# Patient Record
Sex: MALE | Race: BLACK OR AFRICAN AMERICAN | NOT HISPANIC OR LATINO | Employment: OTHER | URBAN - METROPOLITAN AREA
[De-identification: names, ages, dates, MRNs, and addresses within clinical notes are randomized per-mention and may not be internally consistent; named-entity substitution may affect disease eponyms.]

---

## 2017-05-11 ENCOUNTER — HISTORICAL (OUTPATIENT)
Dept: ADMINISTRATIVE | Facility: HOSPITAL | Age: 67
End: 2017-05-11

## 2018-01-03 ENCOUNTER — HISTORICAL (OUTPATIENT)
Dept: ADMINISTRATIVE | Facility: HOSPITAL | Age: 68
End: 2018-01-03

## 2018-03-01 ENCOUNTER — HISTORICAL (OUTPATIENT)
Dept: LAB | Facility: HOSPITAL | Age: 68
End: 2018-03-01

## 2018-07-03 ENCOUNTER — HISTORICAL (OUTPATIENT)
Dept: ADMINISTRATIVE | Facility: HOSPITAL | Age: 68
End: 2018-07-03

## 2018-07-03 LAB
ALBUMIN SERPL-MCNC: 3.7 GM/DL (ref 3.4–5)
ALBUMIN/GLOB SERPL: 1.2 RATIO (ref 1.1–2)
ALP SERPL-CCNC: 58 UNIT/L (ref 50–136)
ALT SERPL-CCNC: 51 UNIT/L (ref 12–78)
AST SERPL-CCNC: 33 UNIT/L (ref 15–37)
BILIRUB SERPL-MCNC: 0.7 MG/DL (ref 0.2–1)
BILIRUBIN DIRECT+TOT PNL SERPL-MCNC: 0.2 MG/DL (ref 0–0.5)
BILIRUBIN DIRECT+TOT PNL SERPL-MCNC: 0.5 MG/DL (ref 0–0.8)
BUN SERPL-MCNC: 15 MG/DL (ref 7–18)
CALCIUM SERPL-MCNC: 9.1 MG/DL (ref 8.5–10.1)
CHLORIDE SERPL-SCNC: 107 MMOL/L (ref 98–107)
CO2 SERPL-SCNC: 29 MMOL/L (ref 21–32)
CREAT SERPL-MCNC: 1.18 MG/DL (ref 0.7–1.3)
GLOBULIN SER-MCNC: 3.1 GM/DL (ref 2.4–3.5)
GLUCOSE SERPL-MCNC: 132 MG/DL (ref 74–106)
POTASSIUM SERPL-SCNC: 4.6 MMOL/L (ref 3.5–5.1)
PROT SERPL-MCNC: 6.8 GM/DL (ref 6.4–8.2)
SODIUM SERPL-SCNC: 143 MMOL/L (ref 136–145)

## 2018-12-12 ENCOUNTER — HISTORICAL (OUTPATIENT)
Dept: ADMINISTRATIVE | Facility: HOSPITAL | Age: 68
End: 2018-12-12

## 2018-12-12 ENCOUNTER — HISTORICAL (OUTPATIENT)
Dept: RADIOLOGY | Facility: HOSPITAL | Age: 68
End: 2018-12-12

## 2019-01-02 ENCOUNTER — HISTORICAL (OUTPATIENT)
Dept: ADMINISTRATIVE | Facility: HOSPITAL | Age: 69
End: 2019-01-02

## 2019-05-02 ENCOUNTER — HISTORICAL (OUTPATIENT)
Dept: ADMINISTRATIVE | Facility: HOSPITAL | Age: 69
End: 2019-05-02

## 2019-09-03 ENCOUNTER — HISTORICAL (OUTPATIENT)
Dept: ADMINISTRATIVE | Facility: HOSPITAL | Age: 69
End: 2019-09-03

## 2020-03-23 ENCOUNTER — HISTORICAL (OUTPATIENT)
Dept: ADMINISTRATIVE | Facility: HOSPITAL | Age: 70
End: 2020-03-23

## 2021-05-19 ENCOUNTER — HISTORICAL (OUTPATIENT)
Dept: RADIOLOGY | Facility: HOSPITAL | Age: 71
End: 2021-05-19

## 2022-04-07 ENCOUNTER — HISTORICAL (OUTPATIENT)
Dept: ADMINISTRATIVE | Facility: HOSPITAL | Age: 72
End: 2022-04-07
Payer: MEDICARE

## 2022-04-19 ENCOUNTER — HISTORICAL (OUTPATIENT)
Dept: ADMINISTRATIVE | Facility: HOSPITAL | Age: 72
End: 2022-04-19
Payer: MEDICARE

## 2022-04-19 LAB
ALBUMIN SERPL-MCNC: 3.8 G/DL (ref 3.4–4.8)
ALBUMIN/GLOB SERPL: 1.5 {RATIO} (ref 1.1–2)
ALP SERPL-CCNC: 44 U/L (ref 40–150)
ALT SERPL-CCNC: 28 U/L (ref 0–55)
AST SERPL-CCNC: 30 U/L (ref 5–34)
BILIRUB SERPL-MCNC: 1 MG/DL
BILIRUBIN DIRECT+TOT PNL SERPL-MCNC: 0.4 (ref 0–0.5)
BILIRUBIN DIRECT+TOT PNL SERPL-MCNC: 0.6 (ref 0–0.8)
BUN SERPL-MCNC: 9.1 MG/DL (ref 8.4–25.7)
CALCIUM SERPL-MCNC: 8.9 MG/DL (ref 8.7–10.5)
CHLORIDE SERPL-SCNC: 105 MMOL/L (ref 98–107)
CO2 SERPL-SCNC: 26 MMOL/L (ref 23–31)
CREAT SERPL-MCNC: 0.96 MG/DL (ref 0.73–1.18)
GLOBULIN SER-MCNC: 2.5 G/DL (ref 2.4–3.5)
GLUCOSE SERPL-MCNC: 171 MG/DL (ref 82–115)
HEMOLYSIS INTERF INDEX SERPL-ACNC: 5
ICTERIC INTERF INDEX SERPL-ACNC: 1
LIPEMIC INTERF INDEX SERPL-ACNC: 8
POTASSIUM SERPL-SCNC: 3.5 MMOL/L (ref 3.5–5.1)
PROT SERPL-MCNC: 6.3 G/DL (ref 5.8–7.6)
SODIUM SERPL-SCNC: 139 MMOL/L (ref 136–145)

## 2022-04-23 VITALS
HEIGHT: 71 IN | BODY MASS INDEX: 33.32 KG/M2 | SYSTOLIC BLOOD PRESSURE: 140 MMHG | DIASTOLIC BLOOD PRESSURE: 82 MMHG | WEIGHT: 238 LBS

## 2022-05-01 NOTE — HISTORICAL [facility]
This is a historical note converted from Oscar. Formatting and pictures may have been removed.  Please reference Oscar for original formatting and attached multimedia. Chief Complaint  RIGHT SHOULDER AND NECK PAIN DOI 12/11/18 HE RAISED HIS ARM TO TURN ON A LIGHT SWITCH FELT IMMEDIATE PAIN. HIS PAIN IS AT A 9 TODAY.  History of Present Illness  He is a patient of Dr. Malin who on yesterday races right arm up to turn off a light switch and felt a sudden immediate pain and numbness down the right arm. He is complaining of severe right shoulder pain with inability to move it.?He complained of numbness down the arm and inability to move his wrist or his?elbow simply because of the pain. He is unable to rotate or lift his right arm because of the pain in his shoulder. This does radiate up to his neck but no significant neck pain.?He has no previous history of injury to his right shoulder.  Review of Systems  Comprehensive Review of Systems performed with no exceptions other than as noted in HPI.  ?  ?  Physical Exam  Vitals & Measurements  BP:?140/82?  HT:?180?cm? HT:?180?cm? WT:?107.95?kg? WT:?107.95?kg? BMI:?33.32?  Examination of the right shoulder. He holds it tightly against his chest.?Radial pulses +2 and symmetrical. Sensation is normal. He has a hard time extending his elbow. He?guards?and restricts any movement of the right shoulder. Palpation over the anterior?shoulder acromion?and posterior shoulder causes severe pain. He has a full range of motion of his neck.  ?  The left shoulder has a full range of motion without pain.  Assessment/Plan  1.?Fracture of greater tuberosity of right humerus?S42.251A  ?  ?  I have placed him in a sling?and giving him a prescription for?hydrocodone 10 mg?well obtain a CT scan and he will follow up with?Dr. Reyes on Friday.  Ordered:  acetaminophen-HYDROcodone, 1 tab(s), Oral, q6hr, PRN PRN as needed for pain, # 30 tab(s), 0 Refill(s)  Clinic Follow up, *Est. 12/13/18  3:00:00 CST, Order for future visit, Fracture of greater tuberosity of right humerus  Right shoulder pain  Neck pain, [facility]  CT Extremity Upper Right W/O Contrast, Routine, 12/12/18 11:16:00 CST, Injury, shoulder scapula & upper arm, None, Stretcher, Patient Has IV?, right shoulder, Rad Type, Order for future visit, Fracture of greater tuberosity of right humerus  Right shoulder pain  Neck pain, Schedule this...  Office/Outpatient Visit Level 3 Established [id number] PC, Fracture of greater tuberosity of right humerus  Right shoulder pain  Neck pain, [facility], 12/12/18 11:15:00 CST  ?  Neck pain?M54.2  Ordered:  acetaminophen-HYDROcodone, 1 tab(s), Oral, q6hr, PRN PRN as needed for pain, # 30 tab(s), 0 Refill(s)  Clinic Follow up, *Est. 12/13/18 3:00:00 CST, Order for future visit, Fracture of greater tuberosity of right humerus  Right shoulder pain  Neck pain, [facility]  CT Extremity Upper Right W/O Contrast, Routine, 12/12/18 11:16:00 CST, Injury, shoulder scapula & upper arm, None, Stretcher, Patient Has IV?, right shoulder, Rad Type, Order for future visit, Fracture of greater tuberosity of right humerus  Right shoulder pain  Neck pain, Schedule this...  Office/Outpatient Visit Level 3 Established [id number] PC, Fracture of greater tuberosity of right humerus  Right shoulder pain  Neck pain, [facility], 12/12/18 11:15:00 CST  XR Spine Cervical 2 or 3 Views, Routine, 12/12/18 10:24:00 CST, Pain, None, Stretcher, Patient Has IV?, Rad Type, Neck pain, Not Scheduled, 12/12/18 10:24:00 CST  ?  Right shoulder pain?M25.511  Ordered:  acetaminophen-HYDROcodone, 1 tab(s), Oral, q6hr, PRN PRN as needed for pain, # 30 tab(s), 0 Refill(s)  Clinic Follow up, *Est. 12/13/18 3:00:00 CST, Order for future visit, Fracture of greater tuberosity of right humerus  Right shoulder pain  Neck pain, [facility]  CT Extremity Upper Right W/O Contrast, Routine, 12/12/18 11:16:00 CST, Injury, shoulder  scapula & upper arm, None, Stretcher, Patient Has IV?, right shoulder, Rad Type, Order for future visit, Fracture of greater tuberosity of right humerus  Right shoulder pain  Neck pain, Schedule this...  Office/Outpatient Visit Level 3 Established [id number] PC, Fracture of greater tuberosity of right humerus  Right shoulder pain  Neck pain, [facility], 12/12/18 11:15:00 CST  XR Shoulder Right Minimum 2 Views, Routine, 12/12/18 10:14:00 CST, Other (please specify), None, Stretcher, Patient Has IV?, Rad Type, Right shoulder pain, Not Scheduled, 12/12/18 10:14:00 CST  ?   Problem List/Past Medical History  Ongoing  arthritis  chronic back pain  diabetic  enlarged prostate  Fracture of greater tuberosity of right humerus  Hypertension  Kidney  Numbness/Tingling  Reflux  sinus allergies  ulcers  Historical  kidney removed  Thyroid disease  Procedure/Surgical History  Colonoscopy  Nephrectomy  penil implant  Stent   Medications  allopurinol 100 mg oral tablet, 100 mg= 1 tab(s), Oral, Daily  ALLOPURINOL 300 MG TABS, 300 mg= 1 tab(s), Oral, Daily  AMLODIPINE BESYLATE 10 MG TABS, 10 mg= 1 tab(s), Oral, Daily,? ?Not taking  aspirin 81 mg oral tablet, CHEWABLE, Oral, Daily  BETAMETHASONE DIPROPIONATE .05 % LOTN,? ?Not taking  BYDUREON PEN 2 MG PEN,? ?Not taking  carvedilol 25 mg oral tablet, 25 mg= 1 tab(s), Oral, BID  Cefazolin 1 gm/D5W 50 m (Premix), 1 gm= 50 mL, IV Piggyback, Once  Centrum Silver oral tablet, 1 tab(s), Oral, Daily  CILOSTAZOL 100 MG TABS, 100 mg= 1 tab(s), Oral, BID  CLOPIDOGREL 75 MG TABS, 75 mg= 1 tab(s), Oral, Daily,? ?Not taking  DOXAZOSIN 4 MG TABS, 4 mg= 1 tab(s), Oral, Daily,? ?Not taking  FENOFIBRATE 145 MG TABS, 145 mg= 1 tab(s), Oral, Daily,? ?Not taking  gabapentin 300 mg oral capsule, 300 mg= 1 cap(s), Oral, Once a day (at bedtime),? ?Not taking  irbesartan 300 mg oral tablet, 300 mg= 1 tab(s), Oral, Daily  LEVOTHYROXINE SODIUM 50 MCG TABS,? ?Not taking  magnesium oxide 250 mg oral tablet, 1  tab, Oral, Daily,? ?Not taking  metformin 500 mg oral tablet, 1 tab, Oral, PC,? ?Not taking  Micardis 80 mg oral tablet, 80 mg= 1 tab(s), Oral, Daily,? ?Not taking  Nexium 40 mg oral delayed release cap ([facility] Substitution), 40 mg, Oral, Daily,? ?Not taking  Norco 10 mg-325 mg oral tablet, 1 tab(s), Oral, q6hr, PRN  potassium gluconate, 1 TAB, Oral, Daily,? ?Not taking  Pravastatin 40 mg Oral Tab, 40 mg= 1 tab(s), Oral, Daily  TAMSULOSIN HCL .4 MG CAPS, 0.4 mg= 1 cap(s), Oral, Daily  VALSARTAN 160 MG TABS, 160 mg= 1 tab(s), Oral, Daily,? ?Not taking  Allergies  No Known Allergies  Social History  Alcohol  Current, Liquor, 04/05/2013  Employment/School  Retired, 05/11/2017  Employed, Highest education level: University degree(s)., 04/05/2013  Home/Environment  Lives with Spouse. Living situation: Home/Independent., 05/11/2017  Substance Abuse  Never, 05/11/2017  Tobacco - Denies Tobacco Use, 04/05/2013  Former smoker, No, 12/12/2018  Past, 04/05/2013  Family History  Family history is negative  Health Maintenance  Health Maintenance  ???Pending?(in the next year)  ??? ??OverDue  ??? ? ? ?Advance Directive due??04/05/14??and every 1??year(s)  ??? ? ? ?Aspirin Therapy for CVD Prevention due??04/10/14??and every 1??year(s)  ??? ??Due?  ??? ? ? ?ADL Screening due??12/12/18??and every 1??year(s)  ??? ? ? ?Alcohol Misuse Screening due??12/12/18??and every 1??year(s)  ??? ? ? ?Cognitive Screening due??12/12/18??and every 1??year(s)  ??? ? ? ?Colorectal Screening (Senior Wellness) due??12/12/18??and every 10??year(s)  ??? ? ? ?Diabetes Maintenance-Eye Exam due??12/12/18??and every?  ??? ? ? ?Diabetes Maintenance-Foot Exam due??12/12/18??and every?  ??? ? ? ?Diabetes Maintenance-Microalbumin due??12/12/18??Variable frequency  ??? ? ? ?Diabetes Maintenance-Urine Dipstick due??12/12/18??Variable frequency  ??? ? ? ?Fall Risk Assessment due??12/12/18??and every 1??year(s)  ??? ? ? ?Functional Assessment due??12/12/18??and every  1??year(s)  ??? ? ? ?Geriatric Depression Screening due??12/12/18??and every 1??year(s)  ??? ? ? ?Hypertension Management-Blood Pressure due??12/12/18??and every?  ??? ? ? ?Hypertension Management-Education due??12/12/18??and every 1??year(s)  ??? ? ? ?Pneumococcal Vaccine due??12/12/18??Variable frequency  ??? ? ? ?Pneumococcal Vaccine due??12/12/18??and every?  ??? ? ? ?Smoking Cessation due??12/12/18??Variable frequency  ??? ? ? ?Tetanus Vaccine due??12/12/18??and every 10??year(s)  ??? ? ? ?Zoster Vaccine due??12/12/18??and every 100??year(s)  ??? ??Due In Future?  ??? ? ? ?Diabetes Maintenance-Fasting Lipid Profile not due until??01/03/19??and every 1??year(s)  ??? ? ? ?Diabetes Maintenance-HgbA1c not due until??01/03/19??and every 1??year(s)  ??? ? ? ?Smoking Cessation (Diabetes) not due until??05/11/19??and every 2??year(s)  ??? ? ? ?Hypertension Management-BMP not due until??07/03/19??and every 1??year(s)  ??? ? ? ?Diabetes Maintenance-Serum Creatinine not due until??07/03/19??and every 1??year(s)  ???Satisfied?(in the past 1 year)  ??? ??Satisfied?  ??? ? ? ?Blood Pressure Screening on??12/12/18.??Satisfied by Nicole Sr  ??? ? ? ?Body Mass Index Check on??12/12/18.??Satisfied by Nicole Sr  ??? ? ? ?Depression Screening on??12/12/18.??Satisfied by Nicole Sr  ??? ? ? ?Diabetes Maintenance-Fasting Lipid Profile on??01/03/18.??Satisfied by Elva Moser  ??? ? ? ?Diabetes Maintenance-HgbA1c on??01/03/18.??Satisfied by Elva Moser  ??? ? ? ?Diabetes Maintenance-Serum Creatinine on??07/03/18.??Satisfied by Jacqueline Collado  ??? ? ? ?Hypertension Management-Blood Pressure on??12/12/18.??Satisfied by Nicole Sr  ??? ? ? ?Hypertension Management-BMP on??07/03/18.??Satisfied by Jacqueline Collado  ??? ? ? ?Lipid Screening on??01/03/18.??Satisfied by Elva Moser  ??? ? ? ?Obesity Screening on??12/12/18.??Satisfied by Nicole Sr  ?  ?  Diagnostic Results  3 views of the right  shoulder. Initially there was a question of a posterior dislocation. However at this point there is a shadow that looks like there is a fragment off of the glenoid or a greater tuberosity fracture.  ?  3 views of the cervical spine and cervical spondylosis worse at C5-6 and 7.

## 2022-05-01 NOTE — HISTORICAL [facility]
This is a historical note converted from Oscar. Formatting and pictures may have been removed.  Please reference Oscar for original formatting and attached multimedia. Chief Complaint  RIGHT KNEE PAIN X MONTHS - NO INJURY - WAS SEEN IN THE PAST FOR HIS LEFT KNEE BUT IS MUCH BETTER - HE ALSO SAW AN ORTHO.  IN [place] LAST YEAR AND RX BILATERAL KNEE BRACES - XRAYS TODAY  History of Present Illness  Knee symptoms ::knee gives way ??? Knee joint pain on the right ??? Worse with weightbearing ??? Worse in the morning  ??? Slowly worsens with extended activity ??? Is increased by bending it ??? By twisting it ??? By kneeling ??? With stairs ??? By squatting  ??? Knee joint swelling on the right??  ?? Knee joint pain not improved by rest ??? Not by ice? ??? No clicking sensation in the right knee ??? No grating sensation in the right knee?  ??? The knee did not suddenly buckle due to contact ??? Not on the right? ??? No popping sound was heard in the knee?  ??? No tingling ??? No burning sensation  ?  ?  Review of Systems  Review of Systems  ????Constitutional: No fever, No chills.  ????Respiratory: No shortness of breath, No cough.  ????Cardiovascular: No chest pain.HAD A STENT PUT IN HIS FEMORAL ARTERY February 2017  ????Gastrointestinal: No nausea, No vomiting, No diarrhea, No constipation, No heartburn.  ????Genitourinary: No dysuria, No hematuria.  ????Hematology/Lymphatics: No bleeding tendency.  ????Endocrine: No polyuria.  ????Neurologic: Alert and oriented X4, No numbness, No tingling.  ????Psychiatric: No anxiety, No depression.  Physical Exam  Vitals & Measurements  HT:?180.34?cm? HT:?180.34?cm? WT:?104.32?kg? WT:?104.32?kg? BMI:?32.08?  PHYSICAL FINDINGS  Cardiovascular:  Arterial Pulses: ? Dorsalis pedis pulses were normal right. ? Dorsalis pedis pulses were normal right.  Musculoskeletal System:  Hips:  General/bilateral: ? No swelling of the hips. ? No induration of the hips.  Right Hip: ? Motion  was normal. ? No pain was elicited by active internal rotation with the hip flexed.  ? No pain was elicited by active external rotation with the hip flexed. ? No pain was elicited by active motion. ? No pain was elicited by passive motion.  Left Hip: ? Motion was normal.  Right Knee: ? Examined. ??NO effusion. ?no Localized swelling. ?Genu varum. ?Patella demonstrated crepitus. ?Anteromedial aspect was tender on palpation. ?Medial aspect was tender on palpation.  patella  Right Knee:  Knee Motion: Value Normal Range  Active flexion? 120 ?degrees  Active extension? 10?degrees  ???  ? Pain was elicited throughout the range of motion. ? Swelling with a negative fluctuation test. ? No erythema. ? No warmth. ? Anterior aspect was not tender on palpation. ? Patellofemoral region was not tender on palpation. ? Medial collateral ligament was not tender on palpation. ? Lateral collateral ligament was not tender on palpation. ? No pain was elicited by motion using Lonnie apprehension test. ? No laxity of the posterior cruciate ligament. ? No anterior drawer sign was present. ? No one plane medial (straight) instability. ? No one plane lateral (straight) instability. ? A Lachman test did not demonstrate one plane anterior instability. ? Clarkes sign was not observed for chondromalacia.  ???  Foot:  Right Foot: ? No excessive pronation. ? No excessive supination.  Functional Exam:  General/bilateral: ? Ambulation did not require a cane. ? Ambulation did not require a walker.  Neurological:  Sensation: ? No sensory exam abnormalities were noted.  Gait and Stance: ? A right-sided antalgic gait was observed.  Skin:  Injury / Incision Site: ? Right knee showed no tissue injury scar.  ???  ???  TESTS  Imaging:  X-Ray Knee:  AP and lateral view x-rays of the right knee with sunrise view of the patella were performed -of right knee.  ???  IMPRESSIONS RADIOLOGY TEST  Narrowing of the right knee joint space bone on bone, showed  sclerosis of the right knee, and osteophytes arising from the right knee.  ?   right knee injection  Administered corticosteroid injection?? ? 2cc cortisone and 2cc lidocaine using sterile technique after informed verbal consent. Risks discussed with patient prior to injection. Informed the patient of the following:  -?Explained to patient that this injection in some patients will experience increased pain a few minutes after the injection and that the pain will last anywhere from 10 to 20 minutes. In order to decrease the pain you need to do the following:  o?Make sure to move the extremity in which the injection was given. If you do not move the medication sits there and can cause more pain.  o?Ice the affected joint every 2 hours for 20 minutes at a time for the next 24 hours.  o?If you are not already taking an anti-inflammatory take the following Ibuprofen/ Advil take 3 to 4 tablets every 6 to 8 hours or 2 Aleve every 12 hours for the next 5 days to help the medication work. If you cannot take anti-inflammatory take 2 extra strength Tylenol every 6 hours for the next 5 days.  ??Patient voiced understanding ?????????????????????????????????????????????????????????????????????????????  Assessment/Plan  1.?Primary osteoarthritis of right knee  Ordered:  betamethasone, 12 mg, Intra-Articular, Once, first dose 05/11/17 15:00:00 CDT, stop date 05/11/17 15:00:00 CDT, 24  Lidocaine inj., 2 mL, Intra-Articular, Once, first dose 05/11/17 14:07:00 CDT, stop date 05/11/17 14:07:00 CDT  asp/inj jnt/bursa, major [id number] PC, 05/11/17 14:07:00 CDT, [facility], Routine, 05/11/17 14:07:00 CDT  Clinic Follow up, 05/11/17 14:07:00 CDT, prn, Primary osteoarthritis of right knee, [facility]  Office/Outpatient Visit Level 3 Established [id number] PC, Primary osteoarthritis of right knee, [facility], 05/11/17 14:07:00 CDT  XR Knee Right 3 Views, Routine, 05/11/17 13:53:00 CDT, Arthritis, None, Stretcher,  Patient Has IV?, Rad Type, Primary osteoarthritis of right knee, Not Scheduled, 05/11/17 13:53:00 CDT  ?   Problem List/Past Medical History  arthritis  chronic back pain  diabetic  enlarged prostate  Hypertension  Numbness/Tingling  Reflux  sinus allergies  ulcers  Historical  kidney removed  Thyroid disease  Procedure/Surgical History  Injection of anesthetic into peripheral nerve for analgesia (04/10/2013), Injection, anesthetic agent; axillary nerve. (04/10/2013), Neuroplasty and/or transposition; median nerve at carpal tunnel. (04/10/2013), Release of carpal tunnel (04/10/2013), Colonoscopy, Nephrectomy, penil implant.  Medications  allopurinol 100 mg oral tablet, 100 mg, 1 tab(s), Oral, Daily  ALLOPURINOL 300 MG TABS, 300 mg, 1 tab(s), Oral, Daily  AMLODIPINE BESYLATE 10 MG TABS, 10 mg, 1 tab(s), Oral, Daily  aspirin 81 mg oral tablet, CHEWABLE, Oral, Daily  BETAMETHASONE DIPROPIONATE .05 % LOTN  BYDUREON PEN 2 MG PEN  Cefazolin 1 gm/D5W 50 m (Premix), 1 gm, 50 mL, IV Piggyback, Once  Celestone, 12 mg, Intra-Articular, Once  Centrum Silver oral tablet, 1 tab(s), Oral, Daily  CILOSTAZOL 100 MG TABS, 100 mg, 1 tab(s), Oral, BID  CLOPIDOGREL 75 MG TABS, 75 mg, 1 tab(s), Oral, Daily  DOXAZOSIN 4 MG TABS, 4 mg, 1 tab(s), Oral, Daily  FENOFIBRATE 145 MG TABS, 145 mg, 1 tab(s), Oral, Daily  gabapentin 300 mg oral capsule, 300 mg, 1 cap(s), Oral, Once a day (at bedtime)  LEVOTHYROXINE SODIUM 50 MCG TABS  lidocaine 2% injectable solution, 2 mL, Intra-Articular, Once  magnesium oxide 250 mg oral tablet, 1 tab, Oral, Daily  metformin 500 mg oral tablet, 1 tab, Oral, PC  Micardis 80 mg oral tablet, 80 mg, 1 tab(s), Oral, Daily,? ?Not taking  Nexium 40 mg oral delayed release cap ([facility] Substitution), 40 mg, Oral, Daily  potassium gluconate, 1 TAB, Oral, Daily  TAMSULOSIN HCL .4 MG CAPS, 0.4 mg, 1 cap(s), Oral, Daily  VALSARTAN 160 MG TABS, 160 mg, 1 tab(s), Oral, Daily  Allergies  No Known Allergies  Social  History  Alcohol  Current, Liquor  Employment/School  Retired  Employed, Highest education level: University degree(s).  Home/Environment  Lives with Spouse. Living situation: Home/Independent.  Substance Abuse  Never  Tobacco - Denies Tobacco Use  Past

## 2022-10-04 ENCOUNTER — OFFICE VISIT (OUTPATIENT)
Dept: ORTHOPEDICS | Facility: CLINIC | Age: 72
End: 2022-10-04
Payer: MEDICARE

## 2022-10-04 ENCOUNTER — HOSPITAL ENCOUNTER (OUTPATIENT)
Dept: RADIOLOGY | Facility: CLINIC | Age: 72
Discharge: HOME | End: 2022-10-04
Attending: SPECIALIST
Payer: MEDICARE

## 2022-10-04 VITALS
BODY MASS INDEX: 30.8 KG/M2 | HEART RATE: 69 BPM | SYSTOLIC BLOOD PRESSURE: 137 MMHG | DIASTOLIC BLOOD PRESSURE: 79 MMHG | WEIGHT: 220 LBS

## 2022-10-04 DIAGNOSIS — M25.562 LEFT KNEE PAIN, UNSPECIFIED CHRONICITY: ICD-10-CM

## 2022-10-04 DIAGNOSIS — M25.562 LEFT KNEE PAIN, UNSPECIFIED CHRONICITY: Primary | ICD-10-CM

## 2022-10-04 PROCEDURE — 99203 PR OFFICE/OUTPT VISIT, NEW, LEVL III, 30-44 MIN: ICD-10-PCS | Mod: ,,, | Performed by: SPECIALIST

## 2022-10-04 PROCEDURE — 73564 XR KNEE COMP 4 OR MORE VIEWS LEFT: ICD-10-PCS | Mod: LT,,, | Performed by: SPECIALIST

## 2022-10-04 PROCEDURE — 99203 OFFICE O/P NEW LOW 30 MIN: CPT | Mod: ,,, | Performed by: SPECIALIST

## 2022-10-04 PROCEDURE — 73564 X-RAY EXAM KNEE 4 OR MORE: CPT | Mod: LT,,, | Performed by: SPECIALIST

## 2022-10-04 RX ORDER — NITROFURANTOIN (MACROCRYSTALS) 100 MG/1
CAPSULE ORAL
COMMUNITY
Start: 2022-07-07

## 2022-10-04 RX ORDER — IRBESARTAN 300 MG/1
300 TABLET ORAL
COMMUNITY

## 2022-10-04 RX ORDER — TAMSULOSIN HYDROCHLORIDE 0.4 MG/1
0.4 CAPSULE ORAL
COMMUNITY

## 2022-10-04 RX ORDER — CARVEDILOL 25 MG/1
25 TABLET ORAL 2 TIMES DAILY
COMMUNITY
Start: 2022-09-18

## 2022-10-04 RX ORDER — AMLODIPINE BESYLATE 5 MG/1
1 TABLET ORAL NIGHTLY
COMMUNITY
Start: 2022-01-13

## 2022-10-04 RX ORDER — AMOXICILLIN 500 MG
2400 CAPSULE ORAL
COMMUNITY

## 2022-10-04 RX ORDER — ALLOPURINOL 300 MG/1
300 TABLET ORAL
COMMUNITY

## 2022-10-04 RX ORDER — ASPIRIN 81 MG/1
162 TABLET ORAL
COMMUNITY

## 2022-10-04 NOTE — PROGRESS NOTES
Chief Complaint:   Chief Complaint   Patient presents with    Knee Pain     left knee. started hurting for about 3-4 months with swelling and pain. last knee injection was about 10 years ago. seen [facility] about 1 month ago and thought possible sciatic nerve pain. was given exercises but no relief. seen  in [place] a few years ago and was given braces (brought them today) but no relief. no pain right now ans swelling isn't bad today.          History of present illness:    This is a 72 y.o. year old male who complains of Knee symptoms are better  ::knee gives way  wears a brace  Knee joint pain on the left  Worse with weightbearing  Worse in the morning   Slowly worsens with extended activity  Is increased by bending it  By twisting it  By kneeling  With stairs  By squatting   Knee joint swelling on the left posteriorly  Medially  Laterally °  Knee joint pain not improved by rest ° Not by ice ° No clicking sensation in the left knee ° No grating sensation in the left knee   ° The knee did not suddenly buckle due to contact ° No popping sound was heard in the knee   ° No tingling ° No burning sensation         Past Medical History:   Diagnosis Date    Arthritis     Diabetes mellitus, type 2     Gout     Hypertension        Past Surgical History:   Procedure Laterality Date    CARDIAC SURGERY      CARPAL TUNNEL RELEASE      KIDNEY SURGERY         Current Outpatient Medications   Medication Instructions    allopurinol (ZYLOPRIM) 300 MG tablet No dose, route, or frequency recorded.    allopurinoL (ZYLOPRIM) 300 mg, Oral    amLODIPine (NORVASC) 5 MG tablet 1 tablet, Oral, Nightly    aspirin (ECOTRIN) 162 mg, Oral    aspirin 81 mg, Oral, Daily    carvediloL (COREG) 25 mg, Oral, 2 times daily    irbesartan (AVAPRO) 300 mg, Oral    magnesium oxide-Mg AA chelate 133 mg Tab 250 mg, Oral, Daily    metFORMIN (GLUCOPHAGE) 500 mg, Oral, 2 times daily with meals    metoprolol tartrate (LOPRESSOR) 50 mg, Oral, 2 times daily     multivit-min-FA-lycopen-lutein 300-600-300 mcg Tab 1 tablet, Oral    MULTIVIT-MIN/FA/LYCOPENE/LUT (CENTRUM SILVER ULTRA MEN'S ORAL) Oral    naproxen sodium (ANAPROX) 220 mg, Oral, 2 times daily with meals    nitrofurantoin (MACRODANTIN) 100 MG capsule Oral    omega-3 fatty acids/fish oil (FISH OIL-OMEGA-3 FATTY ACIDS) 300-1,000 mg capsule 2,400 mg, Oral    POTASSIUM ACETATE MISC 595 mg, Misc.(Non-Drug; Combo Route), Daily    tamsulosin (FLOMAX) 0.4 mg Cp24 No dose, route, or frequency recorded.    tamsulosin (FLOMAX) 0.4 mg, Oral    valsartan (DIOVAN) 160 MG tablet No dose, route, or frequency recorded.        Review of patient's allergies indicates:  No Known Allergies    History reviewed. No pertinent family history.        Review of Systems:    Constitution:   Denies chills, fever, and sweats.  HENT:   Denies headaches or blurry vision.  Cardiovascular:  Denies chest pain or irregular heart beat.  Respiratory:   Denies cough or shortness of breath.  Gastrointestinal:  Denies abdominal pain, nausea, or vomiting.  Musculoskeletal:   Denies muscle cramps.  Neurological:   Denies dizziness or focal weakness.  Psychiatric/Behavior: Normal mental status.  Hematology/Lymph:  Denies bleeding problem or easy bruising/bleeding.  Skin:    Denies rash or suspicious lesions.    Examination:    Vital Signs:    Vitals:    10/04/22 0803   BP: 137/79   Pulse: 69   Weight: 99.8 kg (220 lb)       Body mass index is 30.8 kg/m².    Constitution:   Well-developed, well nourished patient in no acute distress.  Neurological:   Alert and oriented x 3 and cooperative to examination.     Psychiatric/Behavior: Normal mental status.  Respiratory:   No shortness of breath.  Eyes:    Extraoccular muscles intact  Skin:    No scars, rash or suspicious lesions.    Musculoskeletal Exam :      PHYSICAL FINDINGS  Cardiovascular:  Arterial Pulses: ° Dorsalis pedis pulses were normal right. ° Dorsalis pedis pulses were normal left.  Musculoskeletal  System:  Hips:  General/bilateral: ° No swelling of the hips. ° No induration of the hips.  Right Hip: ° Motion was normal. ° No pain was elicited by active internal rotation with the hip flexed.  ° No pain was elicited by active external rotation with the hip flexed. ° No pain was elicited by active motion. ° No pain was elicited by passive motion.  Left Hip: ° Motion was normal.  Left Knee: ° Examined. °- _effusion. °mild Localized swelling. °Genu varum. °Patella demonstrated crepitus. °Anteromedial aspect was tender on palpation. °Medial aspect was tender on palpation.  patella  Left Knee:  Knee Motion: Value   Active flexion    120  degrees  Active extension   5   degrees    left knee ° Pain was elicited throughout the range of motion. ° Swelling with a negative fluctuation test. ° No erythema. ° No warmth. ° Anterior aspect was not tender on palpation. ° Patellofemoral region was not tender on palpation. ° Medial collateral ligament was not tender on palpation. ° Lateral collateral ligament was not tender on palpation. ° No pain was elicited by motion using [place]'s apprehension test. ° No laxity of the posterior cruciate ligament. ° No anterior drawer sign was present. ° No one plane medial (straight) instability. ° No one plane lateral (straight) instability. ° A Lachman test did not demonstrate one plane anterior instability. ° Morales's sign was not observed for chondromalacia.    Right Knee:  Knee Motion: Value Normal Range  Active flexion     125  degrees  Active extension      10 degrees    Foot:  Left Foot: ° No excessive pronation. ° No excessive supination.  Functional Exam:  General/bilateral: ° Ambulation did not require a cane. ° Ambulation did not require a walker.  Neurological:  Sensation: ° No sensory exam abnormalities were noted.  Motor: ° Strength was normal. ° No weakness of the right hip was observed. ° No weakness of the left hip was observed.  Gait and Stance: ° A left-sided antalgic  gait was observed.  Skin:  Injury / Incision Site: ° Left knee showed no tissue injury scar.      TESTS  Imaging:  X-Ray Knee:  AP and lateral view x-rays of the left knee with sunrise view of the patella were performed -of left knee.    IMPRESSIONS RADIOLOGY TEST     Assessment: Left knee pain, unspecified chronicity  -     X-Ray Knee Complete 4 or More Views Left; Future; Expected date: 10/04/2022    Four views were taken of the patient's left knee he has advanced osteoarthritis with varus deformity in the joints place is markedly narrowed he has sclerosis of bone spur formation consistent with advanced osteoarthritis    Plan:  return if pain increases      DISCLAIMER: This note may have been dictated using voice recognition software and may contain grammatical errors.     NOTE: Consult report sent to referring provider via [employer] EMR.

## 2022-10-17 ENCOUNTER — LAB VISIT (OUTPATIENT)
Dept: LAB | Facility: HOSPITAL | Age: 72
End: 2022-10-17
Attending: INTERNAL MEDICINE
Payer: MEDICARE

## 2022-10-17 DIAGNOSIS — N18.2 CHRONIC KIDNEY DISEASE, STAGE II (MILD): Primary | ICD-10-CM

## 2022-10-17 DIAGNOSIS — M10.00 GOUTY NEURITIS: ICD-10-CM

## 2022-10-17 DIAGNOSIS — I10 ESSENTIAL HYPERTENSION, MALIGNANT: ICD-10-CM

## 2022-10-17 DIAGNOSIS — G63 GOUTY NEURITIS: ICD-10-CM

## 2022-10-17 DIAGNOSIS — N39.0 URINARY TRACT INFECTION, SITE NOT SPECIFIED: ICD-10-CM

## 2022-10-17 LAB
ALBUMIN SERPL-MCNC: 4 GM/DL (ref 3.4–4.8)
ALBUMIN/GLOB SERPL: 1.6 RATIO (ref 1.1–2)
ALP SERPL-CCNC: 50 UNIT/L (ref 40–150)
ALT SERPL-CCNC: 19 UNIT/L (ref 0–55)
AST SERPL-CCNC: 26 UNIT/L (ref 5–34)
BILIRUBIN DIRECT+TOT PNL SERPL-MCNC: 1.6 MG/DL
BUN SERPL-MCNC: 10.2 MG/DL (ref 8.4–25.7)
CALCIUM SERPL-MCNC: 9.2 MG/DL (ref 8.8–10)
CHLORIDE SERPL-SCNC: 104 MMOL/L (ref 98–107)
CO2 SERPL-SCNC: 26 MMOL/L (ref 23–31)
CREAT SERPL-MCNC: 0.98 MG/DL (ref 0.73–1.18)
GFR SERPLBLD CREATININE-BSD FMLA CKD-EPI: >60 MLS/MIN/1.73/M2
GLOBULIN SER-MCNC: 2.5 GM/DL (ref 2.4–3.5)
GLUCOSE SERPL-MCNC: 148 MG/DL (ref 82–115)
POTASSIUM SERPL-SCNC: 3.3 MMOL/L (ref 3.5–5.1)
PROT SERPL-MCNC: 6.5 GM/DL (ref 5.8–7.6)
SODIUM SERPL-SCNC: 141 MMOL/L (ref 136–145)

## 2022-10-17 PROCEDURE — 80053 COMPREHEN METABOLIC PANEL: CPT

## 2022-10-17 PROCEDURE — 36415 COLL VENOUS BLD VENIPUNCTURE: CPT

## 2023-04-25 ENCOUNTER — LAB VISIT (OUTPATIENT)
Dept: LAB | Facility: HOSPITAL | Age: 73
End: 2023-04-25
Attending: INTERNAL MEDICINE
Payer: MEDICARE

## 2023-04-25 DIAGNOSIS — G63 GOUTY NEURITIS: ICD-10-CM

## 2023-04-25 DIAGNOSIS — I10 ESSENTIAL HYPERTENSION, MALIGNANT: Primary | ICD-10-CM

## 2023-04-25 DIAGNOSIS — N39.0 URINARY TRACT INFECTION, SITE NOT SPECIFIED: ICD-10-CM

## 2023-04-25 DIAGNOSIS — M10.00 GOUTY NEURITIS: ICD-10-CM

## 2023-04-25 DIAGNOSIS — N18.2 CHRONIC KIDNEY DISEASE, STAGE II (MILD): ICD-10-CM

## 2023-04-25 LAB
ALBUMIN SERPL-MCNC: 3.8 G/DL (ref 3.4–4.8)
ALBUMIN/GLOB SERPL: 1.4 RATIO (ref 1.1–2)
ALP SERPL-CCNC: 56 UNIT/L (ref 40–150)
ALT SERPL-CCNC: 19 UNIT/L (ref 0–55)
AST SERPL-CCNC: 27 UNIT/L (ref 5–34)
BILIRUBIN DIRECT+TOT PNL SERPL-MCNC: 0.8 MG/DL
BUN SERPL-MCNC: 8.4 MG/DL (ref 8.4–25.7)
CALCIUM SERPL-MCNC: 9.2 MG/DL (ref 8.8–10)
CHLORIDE SERPL-SCNC: 104 MMOL/L (ref 98–107)
CO2 SERPL-SCNC: 27 MMOL/L (ref 23–31)
CREAT SERPL-MCNC: 0.97 MG/DL (ref 0.73–1.18)
GFR SERPLBLD CREATININE-BSD FMLA CKD-EPI: >60 MLS/MIN/1.73/M2
GLOBULIN SER-MCNC: 2.7 GM/DL (ref 2.4–3.5)
GLUCOSE SERPL-MCNC: 138 MG/DL (ref 82–115)
POTASSIUM SERPL-SCNC: 3.6 MMOL/L (ref 3.5–5.1)
PROT SERPL-MCNC: 6.5 GM/DL (ref 5.8–7.6)
SODIUM SERPL-SCNC: 140 MMOL/L (ref 136–145)

## 2023-04-25 PROCEDURE — 36415 COLL VENOUS BLD VENIPUNCTURE: CPT

## 2023-04-25 PROCEDURE — 80053 COMPREHEN METABOLIC PANEL: CPT

## 2023-09-26 ENCOUNTER — HOSPITAL ENCOUNTER (OUTPATIENT)
Dept: RADIOLOGY | Facility: CLINIC | Age: 73
Discharge: HOME | End: 2023-09-26
Attending: SPECIALIST
Payer: MEDICARE

## 2023-09-26 ENCOUNTER — OFFICE VISIT (OUTPATIENT)
Dept: ORTHOPEDICS | Facility: CLINIC | Age: 73
End: 2023-09-26
Payer: MEDICARE

## 2023-09-26 VITALS
SYSTOLIC BLOOD PRESSURE: 149 MMHG | WEIGHT: 207 LBS | DIASTOLIC BLOOD PRESSURE: 75 MMHG | HEIGHT: 70 IN | BODY MASS INDEX: 29.63 KG/M2 | HEART RATE: 66 BPM

## 2023-09-26 DIAGNOSIS — M75.122 NONTRAUMATIC COMPLETE TEAR OF LEFT ROTATOR CUFF: ICD-10-CM

## 2023-09-26 DIAGNOSIS — M25.512 LEFT SHOULDER PAIN, UNSPECIFIED CHRONICITY: Primary | ICD-10-CM

## 2023-09-26 DIAGNOSIS — M25.512 LEFT SHOULDER PAIN, UNSPECIFIED CHRONICITY: ICD-10-CM

## 2023-09-26 PROCEDURE — 99214 OFFICE O/P EST MOD 30 MIN: CPT | Mod: 25,,, | Performed by: SPECIALIST

## 2023-09-26 PROCEDURE — 73030 X-RAY EXAM OF SHOULDER: CPT | Mod: LT,,, | Performed by: SPECIALIST

## 2023-09-26 PROCEDURE — 20610 DRAIN/INJ JOINT/BURSA W/O US: CPT | Mod: LT,,, | Performed by: SPECIALIST

## 2023-09-26 PROCEDURE — 73030 XR SHOULDER COMPLETE 2 OR MORE VIEWS LEFT: ICD-10-PCS | Mod: LT,,, | Performed by: SPECIALIST

## 2023-09-26 PROCEDURE — 99214 PR OFFICE/OUTPT VISIT, EST, LEVL IV, 30-39 MIN: ICD-10-PCS | Mod: 25,,, | Performed by: SPECIALIST

## 2023-09-26 PROCEDURE — 20610 LARGE JOINT ASPIRATION/INJECTION: L SUBACROMIAL BURSA: ICD-10-PCS | Mod: LT,,, | Performed by: SPECIALIST

## 2023-09-26 RX ORDER — LIDOCAINE HYDROCHLORIDE 20 MG/ML
5 INJECTION, SOLUTION EPIDURAL; INFILTRATION; INTRACAUDAL; PERINEURAL
Status: DISCONTINUED | OUTPATIENT
Start: 2023-09-26 | End: 2023-09-26 | Stop reason: HOSPADM

## 2023-09-26 RX ORDER — BETAMETHASONE SODIUM PHOSPHATE AND BETAMETHASONE ACETATE 3; 3 MG/ML; MG/ML
12 INJECTION, SUSPENSION INTRA-ARTICULAR; INTRALESIONAL; INTRAMUSCULAR; SOFT TISSUE
Status: DISCONTINUED | OUTPATIENT
Start: 2023-09-26 | End: 2023-09-26 | Stop reason: HOSPADM

## 2023-09-26 RX ORDER — LEVOTHYROXINE SODIUM 25 UG/1
25 TABLET ORAL
COMMUNITY
Start: 2023-03-23

## 2023-09-26 RX ADMIN — LIDOCAINE HYDROCHLORIDE 5 ML: 20 INJECTION, SOLUTION EPIDURAL; INFILTRATION; INTRACAUDAL; PERINEURAL at 08:09

## 2023-09-26 RX ADMIN — BETAMETHASONE SODIUM PHOSPHATE AND BETAMETHASONE ACETATE 12 MG: 3; 3 INJECTION, SUSPENSION INTRA-ARTICULAR; INTRALESIONAL; INTRAMUSCULAR; SOFT TISSUE at 08:09

## 2023-09-26 NOTE — PROGRESS NOTES
Chief Complaint:   Chief Complaint   Patient presents with    Shoulder Pain     Left shoulder pain has been going on for awhile now, it makes him have trouble sleeping, does have numbness and tingling into his fingers, his ROM isnt great either has radiating pains into his fingers, no specific injury or SX         History of present illness:    This is a 73 y.o. year old male who complains of left shoulder pain  No specific injury  Having difficulty sleeping  Pain with ROM      Past Medical History:   Diagnosis Date    Arthritis     Diabetes mellitus, type 2     Gout     Hypertension        Past Surgical History:   Procedure Laterality Date    CARDIAC SURGERY      CARPAL TUNNEL RELEASE      KIDNEY SURGERY         Current Outpatient Medications   Medication Instructions    allopurinol (ZYLOPRIM) 300 MG tablet No dose, route, or frequency recorded.    allopurinoL (ZYLOPRIM) 300 mg, Oral    amLODIPine (NORVASC) 5 MG tablet 1 tablet, Oral, Nightly    aspirin (ECOTRIN) 162 mg, Oral    aspirin 81 mg, Oral, Daily    carvediloL (COREG) 25 mg, Oral, 2 times daily    irbesartan (AVAPRO) 300 mg, Oral    levothyroxine (SYNTHROID) 25 mcg, Oral    magnesium oxide-Mg AA chelate 133 mg Tab 250 mg, Oral, Daily    metFORMIN (GLUCOPHAGE) 500 mg, Oral, 2 times daily with meals    metoprolol tartrate (LOPRESSOR) 50 mg, Oral, 2 times daily    multivit-min-FA-lycopen-lutein 300-600-300 mcg Tab 1 tablet, Oral    MULTIVIT-MIN/FA/LYCOPENE/LUT (CENTRUM SILVER ULTRA MEN'S ORAL) Oral    naproxen sodium (ANAPROX) 220 mg, Oral, 2 times daily with meals    nitrofurantoin (MACRODANTIN) 100 MG capsule Oral    omega-3 fatty acids/fish oil (FISH OIL-OMEGA-3 FATTY ACIDS) 300-1,000 mg capsule 2,400 mg, Oral    POTASSIUM ACETATE MISC 595 mg, Misc.(Non-Drug; Combo Route), Daily    tamsulosin (FLOMAX) 0.4 mg Cp24 No dose, route, or frequency recorded.    tamsulosin (FLOMAX) 0.4 mg, Oral    valsartan (DIOVAN) 160 MG tablet No dose, route, or frequency  "recorded.        Review of patient's allergies indicates:  No Known Allergies    Family History   Problem Relation Age of Onset    Cancer Mother     Dementia Father     Diabetes Brother     Sleep apnea Brother     Cancer Daughter            Review of Systems:    Constitution:   Denies chills, fever, and sweats.  HENT:   Denies headaches or blurry vision.  Cardiovascular:  Denies chest pain or irregular heart beat.  Respiratory:   Denies cough or shortness of breath.  Gastrointestinal:  Denies abdominal pain, nausea, or vomiting.  Musculoskeletal:   Denies muscle cramps.  Neurological:   Denies dizziness or focal weakness.  Psychiatric/Behavior: Normal mental status.  Hematology/Lymph:  Denies bleeding problem or easy bruising/bleeding.  Skin:    Denies rash or suspicious lesions.    Examination:    Vital Signs:    Vitals:    09/26/23 0757   BP: (!) 149/75   Pulse: 66   Weight: 93.9 kg (207 lb)   Height: 5' 10" (1.778 m)       Body mass index is 29.7 kg/m².    Constitution:   Well-developed, well nourished patient in no acute distress.  Neurological:   Alert and oriented x 3 and cooperative to examination.     Psychiatric/Behavior: Normal mental status.  Respiratory:   No shortness of breath.  Eyes:    Extraoccular muscles intact  Skin:    No scars, rash or suspicious lesions.    Musculoskeletal Exam :   left Shoulder:     No swelling, erythema or increased heat    Tender over deltoid, supraspinatus and infraspinatus    Tender over bicipital groove    negative drop arm test     Positive Neer and Read impingement signs    Pain and weakness with rotator cuff resistance   Active shoulder abduction 80  Active forward elevation 170  Active internal rotation 40   Active external rotation 50        X-rays: taken today 4 views left shoulder  No acute bony abnormalities       Assessment: Left shoulder pain, unspecified chronicity  -     X-Ray Shoulder 2 or More Views Left; Future; Expected date: 09/26/2023        Plan:  " will try an injection today for his shoulder pain      DISCLAIMER: This note may have been dictated using voice recognition software and may contain grammatical errors.     NOTE: Consult report sent to referring provider via [employer] EMR.

## 2023-09-26 NOTE — PROCEDURES
Large Joint Aspiration/Injection: L subacromial bursa    Date/Time: 9/26/2023 8:00 AM    Performed by: Jaison Zapata MD  Authorized by: Jaison Zapata MD    Consent Done?:  Yes (Verbal)  Indications:  Pain  Timeout: prior to procedure the correct patient, procedure, and site was verified      Local anesthesia used?: Yes    Anesthesia:  Local infiltration  Local anesthetic:  Lidocaine 2% without epinephrine    Details:  Needle Size:  25 G  Ultrasonic Guidance for needle placement?: No    Approach:  Posterior  Location:  Shoulder  Site:  L subacromial bursa  Medications:  5 mL LIDOcaine (PF) 20 mg/mL (2%) 20 mg/mL (2 %); 12 mg betamethasone acetate-betamethasone sodium phosphate 6 mg/mL  Patient tolerance:  Patient tolerated the procedure well with no immediate complications

## 2023-10-27 ENCOUNTER — LAB VISIT (OUTPATIENT)
Dept: LAB | Facility: HOSPITAL | Age: 73
End: 2023-10-27
Attending: INTERNAL MEDICINE
Payer: MEDICARE

## 2023-10-27 DIAGNOSIS — N39.0 URINARY TRACT INFECTION, SITE NOT SPECIFIED: ICD-10-CM

## 2023-10-27 DIAGNOSIS — I10 ESSENTIAL HYPERTENSION, MALIGNANT: Primary | ICD-10-CM

## 2023-10-27 DIAGNOSIS — N18.2 CHRONIC KIDNEY DISEASE, STAGE II (MILD): ICD-10-CM

## 2023-10-27 LAB
ALBUMIN SERPL-MCNC: 3.7 G/DL (ref 3.4–4.8)
ALBUMIN/GLOB SERPL: 1.4 RATIO (ref 1.1–2)
ALP SERPL-CCNC: 52 UNIT/L (ref 40–150)
ALT SERPL-CCNC: 17 UNIT/L (ref 0–55)
AST SERPL-CCNC: 19 UNIT/L (ref 5–34)
BILIRUB SERPL-MCNC: 0.9 MG/DL
BUN SERPL-MCNC: 11.3 MG/DL (ref 8.4–25.7)
CALCIUM SERPL-MCNC: 9.2 MG/DL (ref 8.8–10)
CHLORIDE SERPL-SCNC: 108 MMOL/L (ref 98–107)
CO2 SERPL-SCNC: 26 MMOL/L (ref 23–31)
CREAT SERPL-MCNC: 1.1 MG/DL (ref 0.73–1.18)
CREAT UR-MCNC: 157 MG/DL (ref 63–166)
GFR SERPLBLD CREATININE-BSD FMLA CKD-EPI: >60 MLS/MIN/1.73/M2
GLOBULIN SER-MCNC: 2.6 GM/DL (ref 2.4–3.5)
GLUCOSE SERPL-MCNC: 118 MG/DL (ref 82–115)
MAGNESIUM SERPL-MCNC: 1.9 MG/DL (ref 1.6–2.6)
PHOSPHATE SERPL-MCNC: 3 MG/DL (ref 2.3–4.7)
POTASSIUM SERPL-SCNC: 3.8 MMOL/L (ref 3.5–5.1)
PROT SERPL-MCNC: 6.3 GM/DL (ref 5.8–7.6)
PROT UR STRIP-MCNC: 20.5 MG/DL
SODIUM SERPL-SCNC: 142 MMOL/L (ref 136–145)
URATE SERPL-MCNC: 3.9 MG/DL (ref 3.5–7.2)
URINE PROTEIN/CREATININE RATIO (OHS): 0.1

## 2023-10-27 PROCEDURE — 36415 COLL VENOUS BLD VENIPUNCTURE: CPT

## 2023-10-27 PROCEDURE — 84100 ASSAY OF PHOSPHORUS: CPT

## 2023-10-27 PROCEDURE — 80053 COMPREHEN METABOLIC PANEL: CPT

## 2023-10-27 PROCEDURE — 83735 ASSAY OF MAGNESIUM: CPT

## 2023-10-27 PROCEDURE — 82570 ASSAY OF URINE CREATININE: CPT

## 2023-10-27 PROCEDURE — 84550 ASSAY OF BLOOD/URIC ACID: CPT

## 2023-10-31 ENCOUNTER — OFFICE VISIT (OUTPATIENT)
Dept: ORTHOPEDICS | Facility: CLINIC | Age: 73
End: 2023-10-31
Payer: MEDICARE

## 2023-10-31 VITALS
HEIGHT: 70 IN | SYSTOLIC BLOOD PRESSURE: 146 MMHG | DIASTOLIC BLOOD PRESSURE: 77 MMHG | HEART RATE: 67 BPM | BODY MASS INDEX: 29.7 KG/M2

## 2023-10-31 DIAGNOSIS — M25.512 LEFT SHOULDER PAIN, UNSPECIFIED CHRONICITY: ICD-10-CM

## 2023-10-31 DIAGNOSIS — M75.122 NONTRAUMATIC COMPLETE TEAR OF LEFT ROTATOR CUFF: Primary | ICD-10-CM

## 2023-10-31 PROCEDURE — 20610 LARGE JOINT ASPIRATION/INJECTION: L SUBACROMIAL BURSA: ICD-10-PCS | Mod: LT,,, | Performed by: SPECIALIST

## 2023-10-31 PROCEDURE — 99214 PR OFFICE/OUTPT VISIT, EST, LEVL IV, 30-39 MIN: ICD-10-PCS | Mod: 25,,, | Performed by: SPECIALIST

## 2023-10-31 PROCEDURE — 20610 DRAIN/INJ JOINT/BURSA W/O US: CPT | Mod: LT,,, | Performed by: SPECIALIST

## 2023-10-31 PROCEDURE — 99214 OFFICE O/P EST MOD 30 MIN: CPT | Mod: 25,,, | Performed by: SPECIALIST

## 2023-10-31 RX ORDER — BETAMETHASONE SODIUM PHOSPHATE AND BETAMETHASONE ACETATE 3; 3 MG/ML; MG/ML
12 INJECTION, SUSPENSION INTRA-ARTICULAR; INTRALESIONAL; INTRAMUSCULAR; SOFT TISSUE
Status: DISCONTINUED | OUTPATIENT
Start: 2023-10-31 | End: 2023-10-31 | Stop reason: HOSPADM

## 2023-10-31 RX ORDER — LIDOCAINE HYDROCHLORIDE 20 MG/ML
5 INJECTION, SOLUTION EPIDURAL; INFILTRATION; INTRACAUDAL; PERINEURAL
Status: DISCONTINUED | OUTPATIENT
Start: 2023-10-31 | End: 2023-10-31 | Stop reason: HOSPADM

## 2023-10-31 RX ADMIN — LIDOCAINE HYDROCHLORIDE 5 ML: 20 INJECTION, SOLUTION EPIDURAL; INFILTRATION; INTRACAUDAL; PERINEURAL at 08:10

## 2023-10-31 RX ADMIN — BETAMETHASONE SODIUM PHOSPHATE AND BETAMETHASONE ACETATE 12 MG: 3; 3 INJECTION, SUSPENSION INTRA-ARTICULAR; INTRALESIONAL; INTRAMUSCULAR; SOFT TISSUE at 08:10

## 2023-10-31 NOTE — PROCEDURES
Large Joint Aspiration/Injection: L subacromial bursa    Date/Time: 10/31/2023 8:00 AM    Performed by: Jaison Zapata MD  Authorized by: Jaison Zapata MD    Consent Done?:  Yes (Verbal)  Indications:  Pain  Timeout: prior to procedure the correct patient, procedure, and site was verified      Local anesthesia used?: Yes    Anesthesia:  Local infiltration  Local anesthetic:  Lidocaine 2% without epinephrine    Details:  Needle Size:  25 G  Ultrasonic Guidance for needle placement?: No    Approach:  Posterior  Location:  Shoulder  Site:  L subacromial bursa  Medications:  5 mL LIDOcaine (PF) 20 mg/mL (2%) 20 mg/mL (2 %); 12 mg betamethasone acetate-betamethasone sodium phosphate 6 mg/mL  Patient tolerance:  Patient tolerated the procedure well with no immediate complications

## 2023-10-31 NOTE — PROGRESS NOTES
Chief Complaint:   Chief Complaint   Patient presents with    Shoulder Pain     Left shoulder inj 9/26/23, states it helped that day and then that night it was a sharp shooting pains hasn't had good sleep since then has numbness and tingling into his fingers states he was taking too much OTC medication          History of present illness:    This is a 73 y.o. year old male who complains of left shoulder pain  Had very limited releif with injection  Still with pain and not sleeping well as a result  Having numbness in the arm at times as well      Past Medical History:   Diagnosis Date    Arthritis     Diabetes mellitus, type 2     Gout     Hypertension        Past Surgical History:   Procedure Laterality Date    CARDIAC SURGERY      CARPAL TUNNEL RELEASE      KIDNEY SURGERY         Current Outpatient Medications   Medication Instructions    allopurinol (ZYLOPRIM) 300 MG tablet No dose, route, or frequency recorded.    allopurinoL (ZYLOPRIM) 300 mg, Oral    amLODIPine (NORVASC) 5 MG tablet 1 tablet, Oral, Nightly    aspirin (ECOTRIN) 162 mg, Oral    aspirin 81 mg, Oral, Daily    carvediloL (COREG) 25 mg, Oral, 2 times daily    irbesartan (AVAPRO) 300 mg, Oral    levothyroxine (SYNTHROID) 25 mcg, Oral    magnesium oxide-Mg AA chelate 133 mg Tab 250 mg, Oral, Daily    metFORMIN (GLUCOPHAGE) 500 mg, Oral, 2 times daily with meals    metoprolol tartrate (LOPRESSOR) 50 mg, Oral, 2 times daily    multivit-min-FA-lycopen-lutein 300-600-300 mcg Tab 1 tablet, Oral    MULTIVIT-MIN/FA/LYCOPENE/LUT (CENTRUM SILVER ULTRA MEN'S ORAL) Oral    naproxen sodium (ANAPROX) 220 mg, Oral, 2 times daily with meals    nitrofurantoin (MACRODANTIN) 100 MG capsule Oral    omega-3 fatty acids/fish oil (FISH OIL-OMEGA-3 FATTY ACIDS) 300-1,000 mg capsule 2,400 mg, Oral    POTASSIUM ACETATE MISC 595 mg, Misc.(Non-Drug; Combo Route), Daily    tamsulosin (FLOMAX) 0.4 mg Cp24 No dose, route, or frequency recorded.    tamsulosin (FLOMAX) 0.4 mg, Oral  "   valsartan (DIOVAN) 160 MG tablet No dose, route, or frequency recorded.        Review of patient's allergies indicates:  No Known Allergies    Family History   Problem Relation Age of Onset    Cancer Mother     Dementia Father     Diabetes Brother     Sleep apnea Brother     Cancer Daughter            Review of Systems:    Constitution:   Denies chills, fever, and sweats.  HENT:   Denies headaches or blurry vision.  Cardiovascular:  Denies chest pain or irregular heart beat.  Respiratory:   Denies cough or shortness of breath.  Gastrointestinal:  Denies abdominal pain, nausea, or vomiting.  Musculoskeletal:   Denies muscle cramps.  Neurological:   Denies dizziness or focal weakness.  Psychiatric/Behavior: Normal mental status.  Hematology/Lymph:  Denies bleeding problem or easy bruising/bleeding.  Skin:    Denies rash or suspicious lesions.    Examination:    Vital Signs:    Vitals:    10/31/23 0805   BP: (!) 146/77   Pulse: 67   Height: 5' 10" (1.778 m)       Body mass index is 29.7 kg/m².    Constitution:   Well-developed, well nourished patient in no acute distress.  Neurological:   Alert and oriented x 3 and cooperative to examination.     Psychiatric/Behavior: Normal mental status.  Respiratory:   No shortness of breath.  Eyes:    Extraoccular muscles intact  Skin:    No scars, rash or suspicious lesions.    Musculoskeletal Exam :   left Shoulder:     No swelling, erythema or increased heat    Tender over deltoid, supraspinatus and infraspinatus    Tender over bicipital groove    negative drop arm test     Positive Neer and Read impingement signs    Pain and weakness with rotator cuff resistance   Active shoulder abduction 80  Active forward elevation 170  Active internal rotation 40   Active external rotation 50             Assessment: Nontraumatic complete tear of left rotator cuff        Plan:  will try a repeat injection today for his shoulder pain  If no relief will get a MRI      DISCLAIMER: This " note may have been dictated using voice recognition software and may contain grammatical errors.     NOTE: Consult report sent to referring provider via [employer] EMR.

## 2023-11-30 ENCOUNTER — OFFICE VISIT (OUTPATIENT)
Dept: ORTHOPEDICS | Facility: CLINIC | Age: 73
End: 2023-11-30
Payer: MEDICARE

## 2023-11-30 VITALS
HEIGHT: 70 IN | BODY MASS INDEX: 28.99 KG/M2 | DIASTOLIC BLOOD PRESSURE: 65 MMHG | WEIGHT: 202.5 LBS | HEART RATE: 88 BPM | SYSTOLIC BLOOD PRESSURE: 135 MMHG

## 2023-11-30 DIAGNOSIS — M75.122 NONTRAUMATIC COMPLETE TEAR OF LEFT ROTATOR CUFF: Primary | ICD-10-CM

## 2023-11-30 PROCEDURE — 99213 OFFICE O/P EST LOW 20 MIN: CPT | Mod: ,,, | Performed by: PHYSICIAN ASSISTANT

## 2023-11-30 PROCEDURE — 99213 PR OFFICE/OUTPT VISIT, EST, LEVL III, 20-29 MIN: ICD-10-PCS | Mod: ,,, | Performed by: PHYSICIAN ASSISTANT

## 2023-11-30 RX ORDER — MELOXICAM 7.5 MG/1
7.5 TABLET ORAL DAILY
Qty: 30 TABLET | Refills: 1 | Status: SHIPPED | OUTPATIENT
Start: 2023-11-30

## 2023-11-30 NOTE — PROGRESS NOTES
Chief Complaint:   Chief Complaint   Patient presents with    Results     MRI results of L shoulder. He did have an injection 10/31/23 with relief but it lasted about a day or 2. States he is still having constant pain unable to lay on it.          History of present illness:    This is a 73 y.o. year old male who complains of left shoulder pain  The last injection only gave him relief for a few days.    He did obtain an MRI is here today to discuss the results and possible options    Past Medical History:   Diagnosis Date    Arthritis     Diabetes mellitus, type 2     Gout     Hypertension        Past Surgical History:   Procedure Laterality Date    CARDIAC SURGERY      CARPAL TUNNEL RELEASE      KIDNEY SURGERY         Current Outpatient Medications   Medication Instructions    allopurinol (ZYLOPRIM) 300 MG tablet No dose, route, or frequency recorded.    allopurinoL (ZYLOPRIM) 300 mg, Oral    amLODIPine (NORVASC) 5 MG tablet 1 tablet, Oral, Nightly    aspirin (ECOTRIN) 162 mg, Oral    aspirin 81 mg, Oral, Daily    carvediloL (COREG) 25 mg, Oral, 2 times daily    irbesartan (AVAPRO) 300 mg, Oral    levothyroxine (SYNTHROID) 25 mcg, Oral    magnesium oxide-Mg AA chelate 133 mg Tab 250 mg, Oral, Daily    meloxicam (MOBIC) 7.5 mg, Oral, Daily    metFORMIN (GLUCOPHAGE) 500 mg, Oral, 2 times daily with meals    metoprolol tartrate (LOPRESSOR) 50 mg, Oral, 2 times daily    multivit-min-FA-lycopen-lutein 300-600-300 mcg Tab 1 tablet, Oral    MULTIVIT-MIN/FA/LYCOPENE/LUT (CENTRUM SILVER ULTRA MEN'S ORAL) Oral    nitrofurantoin (MACRODANTIN) 100 MG capsule Oral    omega-3 fatty acids/fish oil (FISH OIL-OMEGA-3 FATTY ACIDS) 300-1,000 mg capsule 2,400 mg, Oral    POTASSIUM ACETATE MISC 595 mg, Misc.(Non-Drug; Combo Route), Daily    tamsulosin (FLOMAX) 0.4 mg Cp24 No dose, route, or frequency recorded.    tamsulosin (FLOMAX) 0.4 mg, Oral    valsartan (DIOVAN) 160 MG tablet No dose, route, or frequency recorded.        Review  "of patient's allergies indicates:  No Known Allergies    Family History   Problem Relation Age of Onset    Cancer Mother     Dementia Father     Diabetes Brother     Sleep apnea Brother     Cancer Daughter            Review of Systems:    Constitution:   Denies chills, fever, and sweats.  HENT:   Denies headaches or blurry vision.  Cardiovascular:  Denies chest pain or irregular heart beat.  Respiratory:   Denies cough or shortness of breath.  Gastrointestinal:  Denies abdominal pain, nausea, or vomiting.  Musculoskeletal:   Denies muscle cramps.  Neurological:   Denies dizziness or focal weakness.  Psychiatric/Behavior: Normal mental status.  Hematology/Lymph:  Denies bleeding problem or easy bruising/bleeding.  Skin:    Denies rash or suspicious lesions.    Examination:    Vital Signs:    Vitals:    11/30/23 0813   BP: 135/65   Pulse: 88   Weight: 91.9 kg (202 lb 8 oz)   Height: 5' 10" (1.778 m)       Body mass index is 29.06 kg/m².    Constitution:   Well-developed, well nourished patient in no acute distress.  Neurological:   Alert and oriented x 3 and cooperative to examination.     Psychiatric/Behavior: Normal mental status.  Respiratory:   No shortness of breath.  Eyes:    Extraoccular muscles intact  Skin:    No scars, rash or suspicious lesions.    Musculoskeletal Exam :   left Shoulder:     No swelling, erythema or increased heat    Tender over deltoid, supraspinatus and infraspinatus    Tender over bicipital groove    negative drop arm test     Positive Neer and Read impingement signs    Pain and weakness with rotator cuff resistance   Active shoulder abduction 80  Active forward elevation 170  Active internal rotation 40   Active external rotation 50        MRI reviewed  Complete tear of the supraspinatus, with questionable focal developing fatty atrophy versus intramuscular lipoma.     Tendinosis and partial-thickness articular surface tearing of the infraspinatus with early fatty changes.   "   Tendinosis of the cranial fibers of subscapularis without discrete tear.     Severe intra-articular long head of biceps tendinosis.     Tearing of the posterior glenoid labrum.     Findings which may be seen with adhesive capsulitis in the appropriate clinical setting.     Assessment: Nontraumatic complete tear of left rotator cuff    Other orders  -     meloxicam (MOBIC) 7.5 MG tablet; Take 1 tablet (7.5 mg total) by mouth once daily.  Dispense: 30 tablet; Refill: 1        Plan:  At this point the patient is considering having an arthroscopic rotator cuff repair in the left shoulder.    He will follow up in early January discuss surgical options and possible scheduling        DISCLAIMER: This note may have been dictated using voice recognition software and may contain grammatical errors.     NOTE: Consult report sent to referring provider via [employer] EMR.

## 2024-01-10 ENCOUNTER — OFFICE VISIT (OUTPATIENT)
Dept: ORTHOPEDICS | Facility: CLINIC | Age: 74
End: 2024-01-10
Payer: MEDICARE

## 2024-01-10 DIAGNOSIS — M75.122 NONTRAUMATIC COMPLETE TEAR OF LEFT ROTATOR CUFF: Primary | ICD-10-CM

## 2024-01-10 PROCEDURE — 99213 OFFICE O/P EST LOW 20 MIN: CPT | Mod: ,,, | Performed by: SPECIALIST

## 2024-01-10 NOTE — PROGRESS NOTES
Chief Complaint:   Chief Complaint   Patient presents with    Follow-up     L shoulder pain. States he had an injection on 10/31/2023 with relief. MRI done. States he is able to pick his arm up and not much of pain. Rx mobic with relief.  Possibly ready to discuss sx.          History of present illness:    This is a 73 y.o. year old male who is here f/u for his left shoulder  He is doing much better today    Past Medical History:   Diagnosis Date    Arthritis     Diabetes mellitus, type 2     Gout     Hypertension        Past Surgical History:   Procedure Laterality Date    CARDIAC SURGERY      CARPAL TUNNEL RELEASE      KIDNEY SURGERY         Current Outpatient Medications   Medication Instructions    allopurinol (ZYLOPRIM) 300 MG tablet No dose, route, or frequency recorded.    allopurinoL (ZYLOPRIM) 300 mg, Oral    amLODIPine (NORVASC) 5 MG tablet 1 tablet, Oral, Nightly    aspirin (ECOTRIN) 162 mg, Oral    aspirin 81 mg, Oral, Daily    carvediloL (COREG) 25 mg, Oral, 2 times daily    irbesartan (AVAPRO) 300 mg, Oral    levothyroxine (SYNTHROID) 25 mcg, Oral    magnesium oxide-Mg AA chelate 133 mg Tab 250 mg, Oral, Daily    meloxicam (MOBIC) 7.5 mg, Oral, Daily    metFORMIN (GLUCOPHAGE) 500 mg, Oral, 2 times daily with meals    metoprolol tartrate (LOPRESSOR) 50 mg, Oral, 2 times daily    multivit-min-FA-lycopen-lutein 300-600-300 mcg Tab 1 tablet, Oral    MULTIVIT-MIN/FA/LYCOPENE/LUT (CENTRUM SILVER ULTRA MEN'S ORAL) Oral    nitrofurantoin (MACRODANTIN) 100 MG capsule Oral    omega-3 fatty acids/fish oil (FISH OIL-OMEGA-3 FATTY ACIDS) 300-1,000 mg capsule 2,400 mg, Oral    POTASSIUM ACETATE MISC 595 mg, Misc.(Non-Drug; Combo Route), Daily    tamsulosin (FLOMAX) 0.4 mg Cp24 No dose, route, or frequency recorded.    tamsulosin (FLOMAX) 0.4 mg, Oral    valsartan (DIOVAN) 160 MG tablet No dose, route, or frequency recorded.        Review of patient's allergies indicates:  No Known Allergies    Family History    Problem Relation Age of Onset    Cancer Mother     Dementia Father     Diabetes Brother     Sleep apnea Brother     Cancer Daughter            Review of Systems:    Constitution:   Denies chills, fever, and sweats.  HENT:   Denies headaches or blurry vision.  Cardiovascular:  Denies chest pain or irregular heart beat.  Respiratory:   Denies cough or shortness of breath.  Gastrointestinal:  Denies abdominal pain, nausea, or vomiting.  Musculoskeletal:   Denies muscle cramps.  Neurological:   Denies dizziness or focal weakness.  Psychiatric/Behavior: Normal mental status.  Hematology/Lymph:  Denies bleeding problem or easy bruising/bleeding.  Skin:    Denies rash or suspicious lesions.    Examination:    Vital Signs:    There were no vitals filed for this visit.      There is no height or weight on file to calculate BMI.    Constitution:   Well-developed, well nourished patient in no acute distress.  Neurological:   Alert and oriented x 3 and cooperative to examination.     Psychiatric/Behavior: Normal mental status.  Respiratory:   No shortness of breath.  Eyes:    Extraoccular muscles intact  Skin:    No scars, rash or suspicious lesions.    Musculoskeletal Exam :   left Shoulder:     No swelling, erythema or increased heat    Tender over deltoid, supraspinatus and infraspinatus    Tender over bicipital groove    negative drop arm test     Positive Neer and Read impingement signs    Pain and weakness with rotator cuff resistance   Active shoulder abduction 80  Active forward elevation 170  Active internal rotation 40   Active external rotation 50           Assessment: Nontraumatic complete tear of left rotator cuff        Plan:    He will continue with conservative measures as long as his pain in minimal  If his pain increases he will call for injeciton    DISCLAIMER: This note may have been dictated using voice recognition software and may contain grammatical errors.     NOTE: Consult report sent to referring  provider via [employer] EMR.

## 2024-05-02 ENCOUNTER — LAB VISIT (OUTPATIENT)
Dept: LAB | Facility: HOSPITAL | Age: 74
End: 2024-05-02
Payer: MEDICARE

## 2024-05-02 DIAGNOSIS — E03.9 MYXEDEMA HEART DISEASE: ICD-10-CM

## 2024-05-02 DIAGNOSIS — M10.00 GOUTY NEURITIS: ICD-10-CM

## 2024-05-02 DIAGNOSIS — I51.9 MYXEDEMA HEART DISEASE: ICD-10-CM

## 2024-05-02 DIAGNOSIS — G63 GOUTY NEURITIS: ICD-10-CM

## 2024-05-02 DIAGNOSIS — N18.2 CHRONIC KIDNEY DISEASE, STAGE II (MILD): Primary | ICD-10-CM

## 2024-05-02 DIAGNOSIS — N39.0 URINARY TRACT INFECTION, SITE NOT SPECIFIED: ICD-10-CM

## 2024-05-02 LAB
CREAT UR-MCNC: 129.1 MG/DL (ref 63–166)
MAGNESIUM SERPL-MCNC: 1.7 MG/DL (ref 1.6–2.6)
PHOSPHATE SERPL-MCNC: 2.9 MG/DL (ref 2.3–4.7)
PROT UR STRIP-MCNC: 59.8 MG/DL
URATE SERPL-MCNC: 4.7 MG/DL (ref 3.5–7.2)
URINE PROTEIN/CREATININE RATIO (OLG): 0.5

## 2024-05-02 PROCEDURE — 36415 COLL VENOUS BLD VENIPUNCTURE: CPT

## 2024-05-02 PROCEDURE — 84100 ASSAY OF PHOSPHORUS: CPT

## 2024-05-02 PROCEDURE — 84156 ASSAY OF PROTEIN URINE: CPT

## 2024-05-02 PROCEDURE — 82570 ASSAY OF URINE CREATININE: CPT

## 2024-05-02 PROCEDURE — 83735 ASSAY OF MAGNESIUM: CPT

## 2024-05-02 PROCEDURE — 84550 ASSAY OF BLOOD/URIC ACID: CPT

## 2024-11-06 ENCOUNTER — LAB VISIT (OUTPATIENT)
Dept: LAB | Facility: HOSPITAL | Age: 74
End: 2024-11-06
Attending: INTERNAL MEDICINE
Payer: MEDICARE

## 2024-11-06 DIAGNOSIS — I10 ESSENTIAL HYPERTENSION, MALIGNANT: ICD-10-CM

## 2024-11-06 DIAGNOSIS — I51.9 MYXEDEMA HEART DISEASE: ICD-10-CM

## 2024-11-06 DIAGNOSIS — N18.2 CHRONIC KIDNEY DISEASE, STAGE II (MILD): Primary | ICD-10-CM

## 2024-11-06 DIAGNOSIS — M10.00 GOUTY NEURITIS: ICD-10-CM

## 2024-11-06 DIAGNOSIS — G63 GOUTY NEURITIS: ICD-10-CM

## 2024-11-06 DIAGNOSIS — E03.9 MYXEDEMA HEART DISEASE: ICD-10-CM

## 2024-11-06 LAB
ALBUMIN SERPL-MCNC: 3.7 G/DL (ref 3.4–4.8)
ALBUMIN/GLOB SERPL: 1.4 RATIO (ref 1.1–2)
ALP SERPL-CCNC: 46 UNIT/L (ref 40–150)
ALT SERPL-CCNC: 24 UNIT/L (ref 0–55)
ANION GAP SERPL CALC-SCNC: 11 MEQ/L
AST SERPL-CCNC: 34 UNIT/L (ref 5–34)
BILIRUB SERPL-MCNC: 0.7 MG/DL
BUN SERPL-MCNC: 6.9 MG/DL (ref 8.4–25.7)
CALCIUM SERPL-MCNC: 8.7 MG/DL (ref 8.8–10)
CHLORIDE SERPL-SCNC: 106 MMOL/L (ref 98–107)
CO2 SERPL-SCNC: 24 MMOL/L (ref 23–31)
CREAT SERPL-MCNC: 0.98 MG/DL (ref 0.72–1.25)
CREAT UR-MCNC: 77.6 MG/DL (ref 63–166)
CREAT/UREA NIT SERPL: 7
GFR SERPLBLD CREATININE-BSD FMLA CKD-EPI: >60 ML/MIN/1.73/M2
GLOBULIN SER-MCNC: 2.7 GM/DL (ref 2.4–3.5)
GLUCOSE SERPL-MCNC: 148 MG/DL (ref 82–115)
MAGNESIUM SERPL-MCNC: 1.6 MG/DL (ref 1.6–2.6)
PHOSPHATE SERPL-MCNC: 2.6 MG/DL (ref 2.3–4.7)
POTASSIUM SERPL-SCNC: 3.2 MMOL/L (ref 3.5–5.1)
PROT SERPL-MCNC: 6.4 GM/DL (ref 5.8–7.6)
PROT UR STRIP-MCNC: 34.3 MG/DL
SODIUM SERPL-SCNC: 141 MMOL/L (ref 136–145)
URATE SERPL-MCNC: 4.4 MG/DL (ref 3.5–7.2)
URINE PROTEIN/CREATININE RATIO (OLG): 0.4

## 2024-11-06 PROCEDURE — 84156 ASSAY OF PROTEIN URINE: CPT

## 2024-11-06 PROCEDURE — 80053 COMPREHEN METABOLIC PANEL: CPT

## 2024-11-06 PROCEDURE — 84550 ASSAY OF BLOOD/URIC ACID: CPT

## 2024-11-06 PROCEDURE — 84100 ASSAY OF PHOSPHORUS: CPT

## 2024-11-06 PROCEDURE — 36415 COLL VENOUS BLD VENIPUNCTURE: CPT

## 2024-11-06 PROCEDURE — 83735 ASSAY OF MAGNESIUM: CPT

## 2025-01-07 ENCOUNTER — LAB VISIT (OUTPATIENT)
Dept: LAB | Facility: HOSPITAL | Age: 75
End: 2025-01-07
Attending: INTERNAL MEDICINE
Payer: MEDICARE

## 2025-01-07 DIAGNOSIS — N18.2 CHRONIC KIDNEY DISEASE, STAGE II (MILD): ICD-10-CM

## 2025-01-07 DIAGNOSIS — I10 ESSENTIAL HYPERTENSION, MALIGNANT: Primary | ICD-10-CM

## 2025-01-07 LAB
ALBUMIN SERPL-MCNC: 3.9 G/DL (ref 3.4–4.8)
ALBUMIN/GLOB SERPL: 1.4 RATIO (ref 1.1–2)
ALP SERPL-CCNC: 50 UNIT/L (ref 40–150)
ALT SERPL-CCNC: 19 UNIT/L (ref 0–55)
ANION GAP SERPL CALC-SCNC: 10 MEQ/L
AST SERPL-CCNC: 28 UNIT/L (ref 5–34)
BASOPHILS # BLD AUTO: 0.04 X10(3)/MCL
BASOPHILS NFR BLD AUTO: 0.5 %
BILIRUB SERPL-MCNC: 0.7 MG/DL
BUN SERPL-MCNC: 7.4 MG/DL (ref 8.4–25.7)
CALCIUM SERPL-MCNC: 9 MG/DL (ref 8.8–10)
CHLORIDE SERPL-SCNC: 104 MMOL/L (ref 98–107)
CO2 SERPL-SCNC: 26 MMOL/L (ref 23–31)
CREAT SERPL-MCNC: 0.98 MG/DL (ref 0.72–1.25)
CREAT/UREA NIT SERPL: 8
EOSINOPHIL # BLD AUTO: 0.24 X10(3)/MCL (ref 0–0.9)
EOSINOPHIL NFR BLD AUTO: 3.1 %
ERYTHROCYTE [DISTWIDTH] IN BLOOD BY AUTOMATED COUNT: 14.3 % (ref 11.5–17)
GFR SERPLBLD CREATININE-BSD FMLA CKD-EPI: >60 ML/MIN/1.73/M2
GLOBULIN SER-MCNC: 2.7 GM/DL (ref 2.4–3.5)
GLUCOSE SERPL-MCNC: 165 MG/DL (ref 82–115)
HCT VFR BLD AUTO: 42.1 % (ref 42–52)
HGB BLD-MCNC: 14.1 G/DL (ref 14–18)
IMM GRANULOCYTES # BLD AUTO: 0.02 X10(3)/MCL (ref 0–0.04)
IMM GRANULOCYTES NFR BLD AUTO: 0.3 %
LYMPHOCYTES # BLD AUTO: 2.75 X10(3)/MCL (ref 0.6–4.6)
LYMPHOCYTES NFR BLD AUTO: 35.5 %
MCH RBC QN AUTO: 29.9 PG (ref 27–31)
MCHC RBC AUTO-ENTMCNC: 33.5 G/DL (ref 33–36)
MCV RBC AUTO: 89.2 FL (ref 80–94)
MONOCYTES # BLD AUTO: 0.63 X10(3)/MCL (ref 0.1–1.3)
MONOCYTES NFR BLD AUTO: 8.1 %
NEUTROPHILS # BLD AUTO: 4.07 X10(3)/MCL (ref 2.1–9.2)
NEUTROPHILS NFR BLD AUTO: 52.5 %
NRBC BLD AUTO-RTO: 0 %
PLATELET # BLD AUTO: 159 X10(3)/MCL (ref 130–400)
PMV BLD AUTO: 11.1 FL (ref 7.4–10.4)
POTASSIUM SERPL-SCNC: 3.4 MMOL/L (ref 3.5–5.1)
PROT SERPL-MCNC: 6.6 GM/DL (ref 5.8–7.6)
RBC # BLD AUTO: 4.72 X10(6)/MCL (ref 4.7–6.1)
SODIUM SERPL-SCNC: 140 MMOL/L (ref 136–145)
WBC # BLD AUTO: 7.75 X10(3)/MCL (ref 4.5–11.5)

## 2025-01-07 PROCEDURE — 80053 COMPREHEN METABOLIC PANEL: CPT

## 2025-01-07 PROCEDURE — 36415 COLL VENOUS BLD VENIPUNCTURE: CPT

## 2025-01-07 PROCEDURE — 85025 COMPLETE CBC W/AUTO DIFF WBC: CPT
